# Patient Record
Sex: MALE | Race: WHITE | Employment: OTHER | URBAN - METROPOLITAN AREA
[De-identification: names, ages, dates, MRNs, and addresses within clinical notes are randomized per-mention and may not be internally consistent; named-entity substitution may affect disease eponyms.]

---

## 2019-06-04 ENCOUNTER — HOSPITAL ENCOUNTER (EMERGENCY)
Age: 44
Discharge: HOME | End: 2019-06-04
Attending: EMERGENCY MEDICINE
Payer: MEDICAID

## 2019-06-04 VITALS
TEMPERATURE: 98 F | HEART RATE: 115 BPM | SYSTOLIC BLOOD PRESSURE: 132 MMHG | DIASTOLIC BLOOD PRESSURE: 87 MMHG | OXYGEN SATURATION: 98 % | RESPIRATION RATE: 18 BRPM

## 2019-06-04 DIAGNOSIS — T40.601A NARCOTIC OVERDOSE, ACCIDENTAL OR UNINTENTIONAL, INITIAL ENCOUNTER (HCC): Primary | ICD-10-CM

## 2019-06-04 PROCEDURE — 99283 EMERGENCY DEPT VISIT LOW MDM: CPT

## 2019-06-04 NOTE — ED PROVIDER NOTES
1 [facility]  EMERGENCY DEPARTMENT ENCOUNTER          ATTENDING PHYSICIAN NOTE       Date of evaluation: 6/4/2019    Chief Complaint     Drug Overdose      History of Present Illness     Xavier Severin is a 41 y.o. male who presents with a history of polysubstance abuse with drug of choice being heroin who presents to the ED after being referred with prehospital 4 mg of Narcan. Patient states that he is 5 and half months clean. He states that he is in probation. Had been using heroin for 8 years intranasal and intravenous. Says he snorted a line of cocaine today as last thing he remembers. He did respond to Narcan at the scene. He poorly a CPR at the scene. He denies chest pain. All pain. Review of Systems     Review of Systems  No chest pain or shortness of breath. He has no complaints at this time. Denies any withdrawal symptoms as he says his last use was several months ago. Otherwise negative review of systems reported by patient   Past Medical, Surgical, Family, and Social History     He has a past medical history of Essential hypertension, Heroin abuse:in remission since 5/2016, Heroin overdose, Insomnia, and Moderate episode of recurrent major depressive disorder ([facility] [address] He has no past surgical history on file. His family history includes Cancer in his father; No Known Problems in his brother, maternal grandfather, maternal grandmother, mother, paternal grandfather, paternal grandmother, sister, and another family member. He reports that he has never smoked. He has never used smokeless tobacco. He reports that he drinks about 7.2 oz of alcohol per week. He reports that he does not use drugs. Medications     Previous Medications    HYDROCHLOROTHIAZIDE (HYDRODIURIL) 12.5 MG TABLET    Take 1 tablet by mouth daily    SERTRALINE (ZOLOFT) 25 MG TABLET    Take 1 tablet by mouth daily       Allergies     He has No Known Allergies.     Physical Exam     INITIAL VITALS: BP: 132/87, Temp: 98 °F (36.7 °C), Pulse: 115, Resp: 18, SpO2: 98 %    Physical Exam   Constitutional: He is oriented to person, place, and time. He appears well-developed and well-nourished. No distress. HENT:   Head: Normocephalic and atraumatic. Eyes: Pupils are equal, round, and reactive to light. Conjunctivae and EOM are normal.   Neck: Neck supple. Cardiovascular: Normal rate and regular rhythm. Pulmonary/Chest: Effort normal.   Abdominal: Soft. He exhibits no distension. There is no rebound and no guarding. Musculoskeletal: Normal range of motion. Neurological: He is alert and oriented to person, place, and time. Skin: Skin is warm and dry. He is not diaphoretic. Psychiatric: He has a normal mood and affect. His behavior is normal.   Nursing note and vitals reviewed. Diagnostic Results     EKG     RADIOLOGY:  No orders to display       LABS:   No results found for this visit on 06/04/19. ED BEDSIDE ULTRASOUND:      RECENT VITALS:  BP: 132/87,Temp: 98 °F (36.7 °C), Pulse: 115, Resp: 18, SpO2: 98 %     Procedures         ED Course     Nursing Notes, Past Medical Hx, Past Surgical Hx, Social Hx,Allergies, and Family Hx were reviewed. The patient was given the following medications:  No orders of the defined types were placed in this encounter. CONSULTS:  None    MEDICAL DECISIONMAKING / ASSESSMENT / PLAN     Tracy Jones is a 41 y.o. male who presented after starting cocaine and responding to Narcan in the field. Patient will be observed here in the emergency part. A lengthy discussion with this patient. Patient states that he has been doing well however I reminded him that he is in a bar today and he snorted cocaine while on probation. He says he does attend NA meetings once a week as required by probation. We discussed that obviously that this is not working for him and that he needs further help with his addiction.   He states he has been seeing bright few the difficulty affording bright few and showing up. I will refer patient to  addiction sciences at 7:30 AM tomorrow morning he has an appointment to be seen       Patient refused observation period here of at least 2 hours. Risks and benefits including rebound effect no and death were stressed with the patient. He developed a clot anybody else. He'll return instatus post use. He does have an appointment 7:30 tomorrow morning at [facility] addiction    Clinical Impression     1.  Narcotic overdose, accidental or unintentional, initial encounter [facility])        Disposition     PATIENT REFERRED TO:  MD Brooke Curry [address]          [facility] Emergency Department  [address] [facility]          DISCHARGE MEDICATIONS:  New Prescriptions    No medications on file       Zully Mckeon MD  06/04/19 3639

## 2019-06-04 NOTE — ED NOTES
Bed: A04-04  Expected date: 6/4/19  Expected time:   Means of arrival:   Comments:  Magno Islas RN  06/04/19 9967

## 2019-06-16 ENCOUNTER — APPOINTMENT (OUTPATIENT)
Dept: GENERAL RADIOLOGY | Age: 44
End: 2019-06-16
Payer: MEDICAID

## 2019-06-16 ENCOUNTER — HOSPITAL ENCOUNTER (EMERGENCY)
Age: 44
Discharge: HOME | End: 2019-06-16
Attending: EMERGENCY MEDICINE
Payer: MEDICAID

## 2019-06-16 VITALS
TEMPERATURE: 97.5 F | SYSTOLIC BLOOD PRESSURE: 146 MMHG | DIASTOLIC BLOOD PRESSURE: 93 MMHG | HEART RATE: 90 BPM | OXYGEN SATURATION: 100 % | RESPIRATION RATE: 16 BRPM

## 2019-06-16 DIAGNOSIS — M25.572 ACUTE LEFT ANKLE PAIN: ICD-10-CM

## 2019-06-16 DIAGNOSIS — T40.1X1A ACCIDENTAL OVERDOSE OF HEROIN, INITIAL ENCOUNTER (HCC): Primary | ICD-10-CM

## 2019-06-16 PROCEDURE — 99284 EMERGENCY DEPT VISIT MOD MDM: CPT

## 2019-06-16 PROCEDURE — 73630 X-RAY EXAM OF FOOT: CPT

## 2019-06-16 PROCEDURE — 73610 X-RAY EXAM OF ANKLE: CPT

## 2019-06-16 NOTE — ED PROVIDER NOTES
[address] RESIDENT NOTE       Date of evaluation: 6/16/2019    Chief Complaint     Drug Overdose      History of Present Illness     Garland Henry is a 41 y.o. male who presents with overdose. EMS reports that the patient was at his film of his house and was found in the bathroom unresponsive. They called 911. When EMS arrived he was apneic but did have a pulse. They backed him and administered 2 mg of Narcan intranasally and 1 mg IV. He did respond to this. He received these at 6:48 PM.  Vital signs were stable en route. The patient reports that he used what he thought was heroin and uses the amount that he typically uses, however it was more powerful than usual.  He was not trying to harm himself. He thinks that may be was mixed with fentanyl because it was so powerful although he does not know this. He currently reports that he feels drowsy and reports left ankle pain which he thinks happened after he passed out. He denies any chest pain, difficulty breathing, fevers, abdominal pain, extremity pain other than his left ankle pain. No other associated signs or symptoms. No other aggravating or alleviating factors. Of note, the patient previously was on Suboxone therapy through [address] [place] [address]. He stopped using Suboxone and is no longer using it. Review of Systems     ROS:  See HPI. Constitutional: Denies fever  HEENT: Denies vision changes, sore throat  Respiratory: Denies shortness of breath    Cardiovascular: Denies chest pain  GI: Denies nausea, vomiting, diarrhea  : Denies dysuria  Musculoskeletal: Denies muscle aches  Neuro: Denies numbness, tingling, weakness  Skin: Denies rashes  Psych: Denies hallucinations  Endocrine: Denies heat/cold intolerance    A full 10-point review of systems was conducted and is otherwise negative unless noted above.        Past Medical, Surgical, Family, and Social History     He has a past medical history of Essential hypertension, Heroin abuse:in remission since 5/2016, Heroin overdose ([facility] [address] Insomnia, and Moderate episode of recurrent major depressive disorder ([facility] [address] He has no past surgical history on file. His family history includes Cancer in his father; No Known Problems in his brother, maternal grandfather, maternal grandmother, mother, paternal grandfather, paternal grandmother, sister, and another family member. He reports that he has never smoked. He has never used smokeless tobacco. He reports that he drinks about 7.2 oz of alcohol per week. He reports that he has current or past drug history. Drugs: Cocaine, Opiates , IV, and Methamphetamines. Medications     Previous Medications    HYDROCHLOROTHIAZIDE (HYDRODIURIL) 12.5 MG TABLET    Take 1 tablet by mouth daily    SERTRALINE (ZOLOFT) 25 MG TABLET    Take 1 tablet by mouth daily       Allergies     He has No Known Allergies.     Physical Exam     INITIAL VITALS: BP: (!) 146/93, Temp: 97.5 °F (36.4 °C), Pulse: 90, Resp: 16, SpO2: 100 %     General:  well nourished; well developed; in no apparent distress, mildly drowsy  HEENT:  normocephalic, atraumatic; pupils equal, round and reactive; sclera anicteric; conjunctiva pink; moist mucous membranes; no oropharyngeal erythema or mucosal lesions  Neck/Back:  no meningismus; trachea midline, no midline spinal tenderness  Pulmonary:   lung sounds clear to auscultation bilaterally with good air entry; no respiratory distress; no wheezes or rales   Cardiac:  regular rate and rhythm with no murmurs, rubs, or gallops   Abdomen:  soft; non-tender, non-distended; no guarding or rebound; normal bowel sounds   Musculoskeletal:  atraumatic exam with no focal swelling, tender to palpation of left dorsum of foot, mild erythema in this area, no tenderness palpation of medial or lateral malleoli, no peripheral edema   Vascular:  2+ peripheral pulses in bilateral upper and lower extremities   Skin:  warm and well perfused without rashes or lesions   Neuro:  alert and oriented x 3; cranial nerves II - XII grossly intact; normal gait; strength and sensation grossly intact  Psych:  appropriate mood and affect      Diagnostic Results     EKG   None    RADIOLOGY:  XR FOOT LEFT (MIN 3 VIEWS)   Final Result   Impression:    No acute osseous injury. XR ANKLE LEFT (MIN 3 VIEWS)   Final Result   Impression:    No acute osseous injury. LABS:   No results found for this visit on 06/16/19. ED BEDSIDE ULTRASOUND:  None    RECENT VITALS:  BP: (!) 146/93, Temp: 97.5 °F (36.4 °C), Pulse: 90, Resp: 16, SpO2: 100 %     Procedures     None    ED Course     Nursing Notes, Past Medical Hx, Past Surgical Hx, Social Hx, Allergies, and Family Hx were reviewed. The patient was given the following medications:  No orders of the defined types were placed in this encounter. CONSULTS:  None    MEDICAL DECISION MAKING / ASSESSMENT / PLAN     Tony Nam is a 41 y.o. male with history and presentation described above. This patient was also evaluated by the attending physician. All management and disposition plans were discussed and agreed upon. A thorough chart review was performed during the management of this patient. Patient was mildly drowsy but otherwise was in no acute distress. He had an overdose on opiates. Is unclear whether this was heroin or possibly fentanyl. He did respond appropriately to Narcan and is stable here. He does have some tenderness to his left ankle and so x-rays were performed of his ankle and foot which both were negative. He does not appear to have any other injuries. We did observe him here in the emergency department for over 2 hours. Following this, the patient was alert and oriented and did not require any further Narcan. I did set him up with an appointment at [facility] in 2 days time.   The patient does have some Suboxone at home which he reports that he will take tomorrow at

## 2019-06-17 NOTE — ED PROVIDER NOTES
ED Attending Attestation Note     Date of evaluation: 6/16/2019    This patient was seen by the resident. I have seen and examined the patient, agree with the workup, evaluation, management and diagnosis. The care plan has been discussed. My assessment reveals patient here after opiate overdose. The patient was observed here for 2 hours and remained stable without need for further reversal.  The patient has Suboxone at home. He did wish to have help and arrangements were made with [facility].      Lucie Arteaga MD  06/17/19 2516

## 2019-11-05 ENCOUNTER — HOSPITAL ENCOUNTER (EMERGENCY)
Age: 44
Discharge: PSYCHIATRIC OR SUBSTANCE USE FACILITY | End: 2019-11-05
Attending: EMERGENCY MEDICINE
Payer: MEDICAID

## 2019-11-05 VITALS
OXYGEN SATURATION: 99 % | BODY MASS INDEX: 21.25 KG/M2 | RESPIRATION RATE: 18 BRPM | HEART RATE: 97 BPM | TEMPERATURE: 98.8 F | DIASTOLIC BLOOD PRESSURE: 72 MMHG | WEIGHT: 170 LBS | SYSTOLIC BLOOD PRESSURE: 136 MMHG

## 2019-11-05 DIAGNOSIS — F22 DELUSION (HCC): Primary | ICD-10-CM

## 2019-11-05 LAB
A/G RATIO: 1.1 (ref 1.1–2.2)
ACETAMINOPHEN LEVEL: <5 UG/ML (ref 10–30)
ALBUMIN SERPL-MCNC: 4.3 G/DL (ref 3.4–5)
ALP BLD-CCNC: 179 U/L (ref 40–129)
ALT SERPL-CCNC: 210 U/L (ref 10–40)
AMPHETAMINE SCREEN, URINE: ABNORMAL
ANION GAP SERPL CALCULATED.3IONS-SCNC: 23 MMOL/L (ref 3–16)
AST SERPL-CCNC: 333 U/L (ref 15–37)
BARBITURATE SCREEN URINE: ABNORMAL
BASOPHILS ABSOLUTE: 0.1 K/UL (ref 0–0.2)
BASOPHILS RELATIVE PERCENT: 0.6 %
BENZODIAZEPINE SCREEN, URINE: ABNORMAL
BILIRUB SERPL-MCNC: 2 MG/DL (ref 0–1)
BUN BLDV-MCNC: 10 MG/DL (ref 7–20)
CALCIUM SERPL-MCNC: 9.3 MG/DL (ref 8.3–10.6)
CANNABINOID SCREEN URINE: POSITIVE
CHLORIDE BLD-SCNC: 93 MMOL/L (ref 99–110)
CO2: 17 MMOL/L (ref 21–32)
COCAINE METABOLITE SCREEN URINE: ABNORMAL
CREAT SERPL-MCNC: 1 MG/DL (ref 0.9–1.3)
EKG ATRIAL RATE: 123 BPM
EKG DIAGNOSIS: NORMAL
EKG P AXIS: 54 DEGREES
EKG P-R INTERVAL: 144 MS
EKG Q-T INTERVAL: 324 MS
EKG QRS DURATION: 80 MS
EKG QTC CALCULATION (BAZETT): 463 MS
EKG R AXIS: 19 DEGREES
EKG T AXIS: 46 DEGREES
EKG VENTRICULAR RATE: 123 BPM
EOSINOPHILS ABSOLUTE: 0 K/UL (ref 0–0.6)
EOSINOPHILS RELATIVE PERCENT: 0.1 %
ETHANOL: 103 MG/DL (ref 0–0.08)
GFR AFRICAN AMERICAN: >60
GFR NON-AFRICAN AMERICAN: >60
GLOBULIN: 3.8 G/DL
GLUCOSE BLD-MCNC: 104 MG/DL (ref 70–99)
HCT VFR BLD CALC: 35.1 % (ref 40.5–52.5)
HEMOGLOBIN: 11.7 G/DL (ref 13.5–17.5)
LYMPHOCYTES ABSOLUTE: 0.5 K/UL (ref 1–5.1)
LYMPHOCYTES RELATIVE PERCENT: 4.8 %
Lab: ABNORMAL
MCH RBC QN AUTO: 34.1 PG (ref 26–34)
MCHC RBC AUTO-ENTMCNC: 33.4 G/DL (ref 31–36)
MCV RBC AUTO: 102.1 FL (ref 80–100)
METHADONE SCREEN, URINE: ABNORMAL
MONOCYTES ABSOLUTE: 0.9 K/UL (ref 0–1.3)
MONOCYTES RELATIVE PERCENT: 8.6 %
NEUTROPHILS ABSOLUTE: 8.6 K/UL (ref 1.7–7.7)
NEUTROPHILS RELATIVE PERCENT: 85.9 %
OPIATE SCREEN URINE: ABNORMAL
OXYCODONE URINE: ABNORMAL
PDW BLD-RTO: 14.9 % (ref 12.4–15.4)
PH UA: 5
PHENCYCLIDINE SCREEN URINE: ABNORMAL
PLATELET # BLD: 112 K/UL (ref 135–450)
PMV BLD AUTO: 9.3 FL (ref 5–10.5)
POTASSIUM SERPL-SCNC: 3.4 MMOL/L (ref 3.5–5.1)
PROPOXYPHENE SCREEN: ABNORMAL
RBC # BLD: 3.44 M/UL (ref 4.2–5.9)
SALICYLATE, SERUM: <0.3 MG/DL (ref 15–30)
SODIUM BLD-SCNC: 133 MMOL/L (ref 136–145)
TOTAL PROTEIN: 8.1 G/DL (ref 6.4–8.2)
WBC # BLD: 10 K/UL (ref 4–11)

## 2019-11-05 PROCEDURE — 80307 DRUG TEST PRSMV CHEM ANLYZR: CPT

## 2019-11-05 PROCEDURE — G0480 DRUG TEST DEF 1-7 CLASSES: HCPCS

## 2019-11-05 PROCEDURE — 80053 COMPREHEN METABOLIC PANEL: CPT

## 2019-11-05 PROCEDURE — 2580000003 HC RX 258: Performed by: EMERGENCY MEDICINE

## 2019-11-05 PROCEDURE — 99285 EMERGENCY DEPT VISIT HI MDM: CPT

## 2019-11-05 PROCEDURE — 6360000002 HC RX W HCPCS: Performed by: EMERGENCY MEDICINE

## 2019-11-05 PROCEDURE — 85025 COMPLETE CBC W/AUTO DIFF WBC: CPT

## 2019-11-05 PROCEDURE — 36415 COLL VENOUS BLD VENIPUNCTURE: CPT

## 2019-11-05 PROCEDURE — 96374 THER/PROPH/DIAG INJ IV PUSH: CPT

## 2019-11-05 PROCEDURE — 93005 ELECTROCARDIOGRAM TRACING: CPT | Performed by: EMERGENCY MEDICINE

## 2019-11-05 PROCEDURE — 96361 HYDRATE IV INFUSION ADD-ON: CPT

## 2019-11-05 RX ORDER — LORAZEPAM 2 MG/ML
1 INJECTION INTRAMUSCULAR ONCE
Status: COMPLETED | OUTPATIENT
Start: 2019-11-05 | End: 2019-11-05

## 2019-11-05 RX ORDER — 0.9 % SODIUM CHLORIDE 0.9 %
1000 INTRAVENOUS SOLUTION INTRAVENOUS ONCE
Status: COMPLETED | OUTPATIENT
Start: 2019-11-05 | End: 2019-11-05

## 2019-11-05 RX ADMIN — SODIUM CHLORIDE 1000 ML: 9 INJECTION, SOLUTION INTRAVENOUS at 06:29

## 2019-11-05 RX ADMIN — LORAZEPAM 1 MG: 2 INJECTION, SOLUTION INTRAMUSCULAR; INTRAVENOUS at 06:29

## 2019-11-05 ASSESSMENT — ENCOUNTER SYMPTOMS
ABDOMINAL PAIN: 0
COUGH: 0
SHORTNESS OF BREATH: 0
DIARRHEA: 0
VOMITING: 0
NAUSEA: 0

## 2020-03-31 ENCOUNTER — APPOINTMENT (OUTPATIENT)
Dept: GENERAL RADIOLOGY | Age: 45
End: 2020-03-31
Payer: MEDICAID

## 2020-03-31 ENCOUNTER — HOSPITAL ENCOUNTER (EMERGENCY)
Age: 45
Discharge: HOME | End: 2020-03-31
Attending: EMERGENCY MEDICINE
Payer: MEDICAID

## 2020-03-31 VITALS
OXYGEN SATURATION: 96 % | HEART RATE: 103 BPM | RESPIRATION RATE: 18 BRPM | WEIGHT: 165 LBS | DIASTOLIC BLOOD PRESSURE: 90 MMHG | BODY MASS INDEX: 20.51 KG/M2 | HEIGHT: 75 IN | SYSTOLIC BLOOD PRESSURE: 130 MMHG | TEMPERATURE: 99 F

## 2020-03-31 PROCEDURE — 73110 X-RAY EXAM OF WRIST: CPT

## 2020-03-31 PROCEDURE — 73130 X-RAY EXAM OF HAND: CPT

## 2020-03-31 PROCEDURE — 99283 EMERGENCY DEPT VISIT LOW MDM: CPT

## 2020-03-31 RX ORDER — IBUPROFEN 600 MG/1
600 TABLET ORAL EVERY 6 HOURS PRN
Qty: 30 TABLET | Refills: 0 | Status: SHIPPED | OUTPATIENT
Start: 2020-03-31

## 2020-03-31 ASSESSMENT — PAIN SCALES - GENERAL: PAINLEVEL_OUTOF10: 7

## 2020-03-31 NOTE — ED PROVIDER NOTES
Maile Castellanos, [address] [place]  [phone]    Call in 1 week  for follow up if symptoms not improved      DISCHARGE MEDICATIONS:  New Prescriptions    IBUPROFEN (ADVIL;MOTRIN) 600 MG TABLET    Take 1 tablet by mouth every 6 hours as needed for Pain       DISPOSITION Decision To Discharge 03/31/2020 09:17:10 AM            Ric Arellano MD  03/31/20 0814

## 2020-05-08 ENCOUNTER — HOSPITAL ENCOUNTER (EMERGENCY)
Age: 45
Discharge: HOME | End: 2020-05-08
Attending: EMERGENCY MEDICINE
Payer: MEDICAID

## 2020-05-08 VITALS
TEMPERATURE: 98.5 F | RESPIRATION RATE: 18 BRPM | HEART RATE: 79 BPM | SYSTOLIC BLOOD PRESSURE: 118 MMHG | DIASTOLIC BLOOD PRESSURE: 78 MMHG | OXYGEN SATURATION: 99 %

## 2020-05-08 LAB
EKG ATRIAL RATE: 93 BPM
EKG DIAGNOSIS: NORMAL
EKG P AXIS: 59 DEGREES
EKG P-R INTERVAL: 140 MS
EKG Q-T INTERVAL: 376 MS
EKG QRS DURATION: 86 MS
EKG QTC CALCULATION (BAZETT): 467 MS
EKG R AXIS: 35 DEGREES
EKG T AXIS: 79 DEGREES
EKG VENTRICULAR RATE: 93 BPM
GLUCOSE BLD-MCNC: 89 MG/DL (ref 70–99)
PERFORMED ON: NORMAL

## 2020-05-08 PROCEDURE — 93005 ELECTROCARDIOGRAM TRACING: CPT | Performed by: EMERGENCY MEDICINE

## 2020-05-08 PROCEDURE — 99284 EMERGENCY DEPT VISIT MOD MDM: CPT

## 2020-05-08 RX ORDER — NALOXONE HYDROCHLORIDE 4 MG/.1ML
1 SPRAY NASAL PRN
COMMUNITY

## 2020-05-08 RX ORDER — NALOXONE HYDROCHLORIDE 4 MG/.1ML
1 SPRAY NASAL PRN
Status: DISCONTINUED | OUTPATIENT
Start: 2020-05-08 | End: 2020-05-08 | Stop reason: HOSPADM

## 2020-05-08 RX ORDER — NALOXONE HYDROCHLORIDE 0.4 MG/ML
INJECTION, SOLUTION INTRAMUSCULAR; INTRAVENOUS; SUBCUTANEOUS
Status: DISCONTINUED
Start: 2020-05-08 | End: 2020-05-08 | Stop reason: HOSPADM

## 2020-05-08 RX ORDER — NALOXONE HYDROCHLORIDE 4 MG/.1ML
SPRAY NASAL
Status: DISCONTINUED
Start: 2020-05-08 | End: 2020-05-08 | Stop reason: HOSPADM

## 2020-05-08 NOTE — ED NOTES
Bed: B20-20  Expected date:   Expected time:   Means of arrival:   Comments:  Zoya Farmer RN  05/08/20 4686

## 2020-05-08 NOTE — ED PROVIDER NOTES
1 [facility]  EMERGENCY DEPARTMENT ENCOUNTER          ATTENDING PHYSICIAN NOTE       Date of evaluation: 5/8/2020    Chief Complaint     Drug Overdose      History of Present Illness     Dasia Watkins is a 45 y.o. male with history of opiate abuse, alcohol abuse, depression presenting for presumed opiate overdose. Patient was found unresponsive at work. He was given 4 mg Narcan in total by EMS with improvement in his respiratory rate and mental status. No further history can be obtained from the patient. Review of Systems   Unable to obtain given altered mental status      Physical Exam     INITIAL VITALS: BP: (!) 139/101, Temp: 98.5 °F (36.9 °C), Pulse: 97, Resp: 15, SpO2: 96 %     Nursing note and vitals reviewed. General:  Adult male, somnolent, arouses to sternal rub. In no distress. HENT: Normocephalic and atraumatic. External ears normal. Nose appears normal externally. Eyes: Conjunctivae normal. No scleral icterus. Pupils 3mm and sluggish. Neck: Neck supple. No tracheal deviation present. CV: Normal rate. Regular rhythm. S1/S2 auscultated. No murmurs, gallops or rubs. Chest: Effort normal, no bradypnea. Breath sounds clear to auscultation bilaterally. No wheezes. No rales or rhonchi. Abdominal: Soft. No distension. No tenderness. No rebound or guarding. No masses. No peritoneal signs. Musculoskeletal: No edema. No gross deformities. Neurological: Somnolent, localizes to pain with all extremities. Skin: Warm, dry. No rash. No diaphoresis or erythema. Diagnostic Results     EKG   Sinus rhythm, ventricular rate 93. Intervals normal, axis normal.  Significant movement interference of baseline, but no ST or T wave changes visible suggestive of acute ischemia.     RADIOLOGY:  No orders to display       LABS:   Results for orders placed or performed during the hospital encounter of 05/08/20   POCT Glucose   Result Value Ref Range    POC Glucose 89 70 - 99 mg/dl    Performed on ([facility] [address] Insomnia, and Moderate episode of recurrent major depressive disorder ([facility] [address] He has no past surgical history on file. His family history includes Cancer in his father; No Known Problems in his brother, maternal grandfather, maternal grandmother, mother, paternal grandfather, paternal grandmother, sister, and another family member. He reports that he has never smoked. He has never used smokeless tobacco. He reports current alcohol use of about 12.0 standard drinks of alcohol per week. He reports current drug use. Drug: Marijuana. Medications     Previous Medications    HYDROCHLOROTHIAZIDE (HYDRODIURIL) 12.5 MG TABLET    Take 1 tablet by mouth daily    IBUPROFEN (ADVIL;MOTRIN) 600 MG TABLET    Take 1 tablet by mouth every 6 hours as needed for Pain    SERTRALINE (ZOLOFT) 25 MG TABLET    Take 1 tablet by mouth daily       Allergies     He has No Known Allergies. ED Course     Nursing Notes, Past Medical Hx, Past Surgical Hx, Social Hx,Allergies, and Family Hx were reviewed.     Patient was given the following medications:  Orders Placed This Encounter   Medications    naloxone (NARCAN) 0.4 MG/ML injection     Lamin Coil: cabinet override    Naloxone HCl (NALOXONE OPIATE OVERDOSE KIT)     Si each by Nasal route once for 1 dose     Dispense:  1 kit     Refill:  0       CONSULTS:  None    PATIENT REFERRED TO:  [facility] Emergency Department  [address] [place] [address]  [phone]    If symptoms worsen      DISCHARGE MEDICATIONS:  New Prescriptions    NALOXONE HCL (NALOXONE OPIATE OVERDOSE KIT)    1 each by Nasal route once for 1 dose        Myriam Shin MD  [phone]

## 2020-05-09 NOTE — ED NOTES
NALOXONE:  Patient Assessment and Dispensing Record   Date:  5/8/2020  Patient Name: Guanakito Lewis  Patient Address: [address] [place] [id number]         Patient Selection: Check that the following condition is met:  [x]  The individual receiving the information and medication is        oriented to person, place, and time and able to understand and learn the        essential components of overdose response and naloxone administration. Indication for dispensing naloxone: (check at least one)  [x]  Previous opioid intoxication or overdose. []  History of nonmedical opioid use.   []  Initiation or cessation of methadone or buprenorphine for opioid use disorder        treatment. []  Higher-dose (>50 mg morphine equivalent/day) opioid prescription. []  Receiving any opioid prescription plus (select below):  [] Rotated from one opioid to another because of possible incomplete cross-tolerance. [] Smoking, COPD, emphysema, asthma, sleep apnea, respiratory infection or other respiratory illness. [] Renal dysfunction, hepatic disease, cardiac illness or HIV/AIDS. [] Known or suspected concurrent alcohol use. [] Concurrent benzodiazepine or other sedative prescription. [] Concurrent antidepressant prescription. [] Patients who may have difficulty accessing emergency medical services (distance, remoteness). [] Voluntary request from a family member, friend,  or other person in a position to assist an individual who is at risk of experiencing an opioid-related overdose. I (the undersigned) attest that:  (check each box to signify completion)  [x]  I am authorized per hospital protocol to dispense naloxone to this patient. [x]  The individual has been screened for contraindications/precautions and        counseled appropriately.   [x]  I have counseled the patient using the Overdose Recognition and Response Guide  [x]  I have updated the Home Med List to reflect this dispensing of naloxone. Signature:   Vero Dee  5/8/2020, 9:07 PM            Vero Dee, RN  05/08/20 9062

## 2020-11-10 ENCOUNTER — HOSPITAL ENCOUNTER (EMERGENCY)
Age: 45
Discharge: HOME | End: 2020-11-10
Attending: EMERGENCY MEDICINE
Payer: MEDICAID

## 2020-11-10 VITALS
SYSTOLIC BLOOD PRESSURE: 122 MMHG | DIASTOLIC BLOOD PRESSURE: 76 MMHG | TEMPERATURE: 97.9 F | HEART RATE: 98 BPM | OXYGEN SATURATION: 99 % | RESPIRATION RATE: 18 BRPM

## 2020-11-10 LAB
A/G RATIO: 1.5 (ref 1.1–2.2)
ACETAMINOPHEN LEVEL: <5 UG/ML (ref 10–30)
ALBUMIN SERPL-MCNC: 4.7 G/DL (ref 3.4–5)
ALP BLD-CCNC: 74 U/L (ref 40–129)
ALT SERPL-CCNC: 52 U/L (ref 10–40)
ANION GAP SERPL CALCULATED.3IONS-SCNC: 9 MMOL/L (ref 3–16)
AST SERPL-CCNC: 53 U/L (ref 15–37)
BASOPHILS ABSOLUTE: 0 K/UL (ref 0–0.2)
BASOPHILS RELATIVE PERCENT: 0.2 %
BILIRUB SERPL-MCNC: 0.8 MG/DL (ref 0–1)
BUN BLDV-MCNC: 18 MG/DL (ref 7–20)
CALCIUM SERPL-MCNC: 9.4 MG/DL (ref 8.3–10.6)
CHLORIDE BLD-SCNC: 101 MMOL/L (ref 99–110)
CO2: 31 MMOL/L (ref 21–32)
CREAT SERPL-MCNC: 1 MG/DL (ref 0.9–1.3)
EKG ATRIAL RATE: 91 BPM
EKG DIAGNOSIS: NORMAL
EKG P AXIS: 45 DEGREES
EKG P-R INTERVAL: 134 MS
EKG Q-T INTERVAL: 374 MS
EKG QRS DURATION: 90 MS
EKG QTC CALCULATION (BAZETT): 460 MS
EKG R AXIS: 38 DEGREES
EKG T AXIS: 43 DEGREES
EKG VENTRICULAR RATE: 91 BPM
EOSINOPHILS ABSOLUTE: 0.1 K/UL (ref 0–0.6)
EOSINOPHILS RELATIVE PERCENT: 1.4 %
ETHANOL: NORMAL MG/DL (ref 0–0.08)
GFR AFRICAN AMERICAN: >60
GFR NON-AFRICAN AMERICAN: >60
GLOBULIN: 3.1 G/DL
GLUCOSE BLD-MCNC: 67 MG/DL (ref 70–99)
GLUCOSE BLD-MCNC: 71 MG/DL (ref 70–99)
HCT VFR BLD CALC: 37.6 % (ref 40.5–52.5)
HEMOGLOBIN: 12.8 G/DL (ref 13.5–17.5)
LYMPHOCYTES ABSOLUTE: 1.4 K/UL (ref 1–5.1)
LYMPHOCYTES RELATIVE PERCENT: 20.2 %
MCH RBC QN AUTO: 31.2 PG (ref 26–34)
MCHC RBC AUTO-ENTMCNC: 34 G/DL (ref 31–36)
MCV RBC AUTO: 91.9 FL (ref 80–100)
MONOCYTES ABSOLUTE: 0.5 K/UL (ref 0–1.3)
MONOCYTES RELATIVE PERCENT: 7.7 %
NEUTROPHILS ABSOLUTE: 4.8 K/UL (ref 1.7–7.7)
NEUTROPHILS RELATIVE PERCENT: 70.5 %
PDW BLD-RTO: 14.2 % (ref 12.4–15.4)
PERFORMED ON: ABNORMAL
PLATELET # BLD: 238 K/UL (ref 135–450)
PMV BLD AUTO: 7.1 FL (ref 5–10.5)
POTASSIUM SERPL-SCNC: 3.6 MMOL/L (ref 3.5–5.1)
RBC # BLD: 4.1 M/UL (ref 4.2–5.9)
SALICYLATE, SERUM: <0.3 MG/DL (ref 15–30)
SODIUM BLD-SCNC: 141 MMOL/L (ref 136–145)
TOTAL PROTEIN: 7.8 G/DL (ref 6.4–8.2)
WBC # BLD: 6.8 K/UL (ref 4–11)

## 2020-11-10 PROCEDURE — G0480 DRUG TEST DEF 1-7 CLASSES: HCPCS

## 2020-11-10 PROCEDURE — 93005 ELECTROCARDIOGRAM TRACING: CPT | Performed by: EMERGENCY MEDICINE

## 2020-11-10 PROCEDURE — 80053 COMPREHEN METABOLIC PANEL: CPT

## 2020-11-10 PROCEDURE — 99284 EMERGENCY DEPT VISIT MOD MDM: CPT

## 2020-11-10 PROCEDURE — 93010 ELECTROCARDIOGRAM REPORT: CPT | Performed by: INTERNAL MEDICINE

## 2020-11-10 PROCEDURE — 85025 COMPLETE CBC W/AUTO DIFF WBC: CPT

## 2020-11-10 ASSESSMENT — ENCOUNTER SYMPTOMS
SORE THROAT: 0
VOMITING: 0
ABDOMINAL PAIN: 0
DIARRHEA: 0
SHORTNESS OF BREATH: 0
NAUSEA: 0
COUGH: 0
CONSTIPATION: 0
BACK PAIN: 0

## 2020-11-10 NOTE — ED NOTES
RN stressed importance of urine sample.  Pt given urinal and assisted up to urinate     Radha Ramirez RN  11/10/20 6075

## 2020-11-10 NOTE — ED NOTES
Discharge paperwork given to and reviewed with pt. Pt verbalized understanding and all questions answered. Pt encouraged to return if having worsening symptoms or new symptoms discussed in discharge paperwork. Pt to follow up with pcp if needed  IV removed with catheter intact. Pt in NAD, RR even and unlabored. Pt off unit ambulatory.  Pt called brother and sts he is coming to pick him up     Yani Victoria RN  11/10/20 5819

## 2020-11-10 NOTE — ED PROVIDER NOTES
[address] [facility]  ED  EMERGENCY DEPARTMENT ENCOUNTER      Pt Name: Clint Haas  MRN: [id number]  Vladgfnathaniel 1975  Date of evaluation: 11/10/2020  Provider: Wilber Frank MD    CHIEF COMPLAINT       Chief Complaint   Patient presents with    Altered Mental Status     pt was found in [employer] parking lot trying to get in, denies drug usage, hx of DM bs 76         HISTORY OF PRESENT ILLNESS   (Location/Symptom, Timing/Onset, Context/Setting, Quality, Duration, Modifying Factors, Severity)  Note limiting factors. Clint Haas is a 39 y.o. male who presents to the emergency department     Patient is a 81-year-old male with a past medical history of hypertension, heroin abuse, polysubstance abuse who presents after being found in a [address] [facility] parking lot. Patient is unsure how he got to the [address] [facility] but was trying to get into [employer] even though was picked up only. EMS was called and transported patient here to the emergency department. Patient reports that he drank a beer recently but has not recently used any other drugs though he does admit to using drugs frequently. He is oriented to person and time but not to place and believes that he is in [place]. He denies any complaints at this time. The history is provided by the patient and the EMS personnel. Nursing Notes were reviewed. REVIEW OF SYSTEMS    (2-9 systems for level 4, 10 or more for level 5)     Review of Systems   Constitutional: Negative for chills and fever. HENT: Negative for congestion and sore throat. Eyes: Negative for visual disturbance. Respiratory: Negative for cough and shortness of breath. Cardiovascular: Negative for chest pain. Gastrointestinal: Negative for abdominal pain, constipation, diarrhea, nausea and vomiting. Genitourinary: Negative for dysuria and hematuria. Musculoskeletal: Negative for back pain and neck pain. Skin: Negative for pallor and rash.    Neurological: Negative for light-headedness and headaches. All other systems reviewed and are negative. Except as noted above the remainder of the review of systems was reviewed and negative. PAST MEDICAL HISTORY     Past Medical History:   Diagnosis Date    Essential hypertension 10/21/2016    Heroin abuse:in remission since 5/2016 8/23/2016    Heroin overdose ([facility] 03/08/2016    [facility]    Insomnia 8/23/2016    Moderate episode of recurrent major depressive disorder ([facility] 8/23/2016         SURGICAL HISTORY     History reviewed. No pertinent surgical history. CURRENT MEDICATIONS       Discharge Medication List as of 11/10/2020 10:43 AM      CONTINUE these medications which have NOT CHANGED    Details   naloxone 4 MG/0.1ML LIQD nasal spray 1 spray by Nasal route as needed for Opioid Reversal Narcan to goHistorical Med      ibuprofen (ADVIL;MOTRIN) 600 MG tablet Take 1 tablet by mouth every 6 hours as needed for Pain, Disp-30 tablet, R-0Print      sertraline (ZOLOFT) 25 MG tablet Take 1 tablet by mouth daily, Disp-30 tablet, R-2      hydrochlorothiazide (HYDRODIURIL) 12.5 MG tablet Take 1 tablet by mouth daily, Disp-30 tablet, R-0             ALLERGIES     Patient has no known allergies.     FAMILY HISTORY       Family History   Problem Relation Age of Onset    No Known Problems Mother     Cancer Father         Liver cancer:etoh abuse hx    No Known Problems Sister     No Known Problems Brother     No Known Problems Maternal Grandmother     No Known Problems Maternal Grandfather     No Known Problems Paternal Grandmother     No Known Problems Paternal Grandfather     No Known Problems Other           SOCIAL HISTORY       Social History     Socioeconomic History    Marital status: Single     Spouse name: None    Number of children: None    Years of education: None    Highest education level: None   Occupational History    Occupation: Unemployed   Social Needs    Financial resource strain: None    Food insecurity     Worry: None     Inability: None    Transportation needs     Medical: None     Non-medical: None   Tobacco Use    Smoking status: Never Smoker    Smokeless tobacco: Never Used   Substance and Sexual Activity    Alcohol use: Yes     Alcohol/week: 12.0 standard drinks     Types: 12 Cans of beer per week     Comment: daily 1/4-1/2 bottle vodka     Drug use: Yes     Types: Marijuana     Comment: daily-states hx heroin use but clean 2016    Sexual activity: Yes     Comment: single- no children    Lifestyle    Physical activity     Days per week: None     Minutes per session: None    Stress: None   Relationships    Social connections     Talks on phone: None     Gets together: None     Attends Yazidism service: None     Active member of club or organization: None     Attends meetings of clubs or organizations: None     Relationship status: None    Intimate partner violence     Fear of current or ex partner: None     Emotionally abused: None     Physically abused: None     Forced sexual activity: None   Other Topics Concern    None   Social History Narrative    None       SCREENINGS    [place] Coma Scale  Eye Opening: Spontaneous  Best Verbal Response: Oriented  Best Motor Response: Obeys commands  Steven Coma Scale Score: 15          PHYSICAL EXAM    (up to 7 for level 4, 8 or more for level 5)     ED Triage Vitals [11/10/20 0804]   BP Temp Temp Source Pulse Resp SpO2 Height Weight   120/76 97.9 °F (36.6 °C) Oral 95 18 98 % -- --       Physical Exam  Vitals signs and nursing note reviewed. Constitutional:       General: He is not in acute distress. Appearance: Normal appearance. HENT:      Head: Normocephalic and atraumatic. Nose: Nose normal. No congestion. Mouth/Throat:      Mouth: Mucous membranes are moist.   Eyes:      Extraocular Movements: Extraocular movements intact.       Conjunctiva/sclera: Conjunctivae normal.      Pupils: Pupils are equal, round, and reactive to light. Neck:      Musculoskeletal: Normal range of motion and neck supple. Cardiovascular:      Rate and Rhythm: Normal rate. Pulses: Normal pulses. Heart sounds: Normal heart sounds. No murmur. Pulmonary:      Effort: Pulmonary effort is normal. No respiratory distress. Breath sounds: Normal breath sounds. Abdominal:      General: There is no distension. Palpations: Abdomen is soft. Tenderness: There is no abdominal tenderness. Musculoskeletal: Normal range of motion. General: No swelling or deformity. Skin:     General: Skin is warm and dry. Neurological:      General: No focal deficit present. Mental Status: He is alert. He is disoriented. Cranial Nerves: No cranial nerve deficit. Sensory: No sensory deficit. Motor: No weakness.          DIAGNOSTIC RESULTS     EKG: All EKG's are interpreted by the Emergency Department Physician who either signs or Co-signs this chart in the absence of a cardiologist.    Normal sinus rhythm, rate 91, normal intervals, no STEMI, similar to previous EKG dated May 8, 2020    RADIOLOGY:     Interpretation per the Radiologist below, if available at the time of this note:    No orders to display         LABS:  Labs Reviewed   CBC WITH AUTO DIFFERENTIAL - Abnormal; Notable for the following components:       Result Value    RBC 4.10 (*)     Hemoglobin 12.8 (*)     Hematocrit 37.6 (*)     All other components within normal limits    Narrative:     Performed at:  [address],  [place], [address] [place]   Phone [phone]   COMPREHENSIVE METABOLIC PANEL - Abnormal; Notable for the following components:    ALT 52 (*)     AST 53 (*)     All other components within normal limits    Narrative:     Performed at:  [address],  [place], [address]   Phone [phone]   ACETAMINOPHEN LEVEL - Abnormal; Notable for the following components:    Acetaminophen Level <5 (*)     All other components within normal limits    Narrative:     Performed at:  [address],  [address] [facility] [address], [address] [place]   Phone [phone]   SALICYLATE LEVEL - Abnormal; Notable for the following components:    Salicylate, Serum <5.2 (*)     All other components within normal limits    Narrative:     Performed at:  [address],  [address], [address] [place]   Phone [phone]   POCT GLUCOSE - Abnormal; Notable for the following components:    POC Glucose 67 (*)     All other components within normal limits    Narrative:     Performed at:  [address],  [address] [facility] [address], [address] [place]   Phone [phone]   ETHANOL    Narrative:     Performed at:  [address],  [address] [facility] [address], [address] [place]   Phone [phone]       All other labs were within normal range or not returned as of this dictation. EMERGENCY DEPARTMENT COURSE and DIFFERENTIAL DIAGNOSIS/MDM:   Vitals:    Vitals:    11/10/20 0804 11/10/20 0926   BP: 120/76 122/76   Pulse: 95 98   Resp: 18 18   Temp: 97.9 °F (36.6 °C)    TempSrc: Oral    SpO2: 98% 99%       Patient evaluated and previous record reviewed. Patient presents with altered mental status after being found in [employer] slightly confused. Vital signs stable and within normal limits. Physical exam as documented above. Lab work obtained and largely unremarkable. Patient declines to provide a urine sample. Patient observed in the emergency department for a significant amount of time. On reevaluation he is awake, alert, and oriented x3. He remembers getting out of the car and trying to go into [employer] but does not remember much after that.   He does state that he was spraying the inside of the house with pain without proper airway equipment on and thinks that maybe the paint fumes had affected him. He continues to deny any drug abuse today and his last alcohol use was yesterday. As patient is currently awake, alert, and oriented without any acute complaints I do feel as though it is reasonable to discharge him at this time. Did  patient on polysubstance abuse. Patient discharged with return precautions. CONSULTS:  None    PROCEDURES:  Unless otherwise noted below, none     Procedures      FINAL IMPRESSION      1. Altered mental status, unspecified altered mental status type          DISPOSITION/PLAN   DISPOSITION        PATIENT REFERRED TO:  MD Brooke Maurice [address]    Schedule an appointment as soon as possible for a visit         DISCHARGE MEDICATIONS:  Discharge Medication List as of 11/10/2020 10:43 AM        Controlled Substances Monitoring:     No flowsheet data found.     (Please note that portions of this note were completed with a voice recognition program.  Efforts were made to edit the dictations but occasionally words are mis-transcribed.)    Damien Iyer MD (electronically signed)  Attending Emergency Physician            Cole Gatica MD  11/10/20 [id number]

## 2020-11-10 NOTE — ED NOTES
Bed: 12  Expected date:   Expected time:   Means of arrival:   Comments:  [address], RN  11/10/20 9336

## 2020-11-10 NOTE — ED NOTES
Pt reports that he wants to sign himself out. RN notified MD Merari Carter. Pt reports that he would call his aunt to come pick him up. He has her number. MD to discuss plan with pt.       Kamryn Salamanca RN  11/10/20 [id number]
